# Patient Record
Sex: MALE | Race: WHITE
[De-identification: names, ages, dates, MRNs, and addresses within clinical notes are randomized per-mention and may not be internally consistent; named-entity substitution may affect disease eponyms.]

---

## 2017-09-22 ENCOUNTER — HOSPITAL ENCOUNTER (EMERGENCY)
Dept: HOSPITAL 80 - FED | Age: 37
Discharge: HOME | End: 2017-09-22
Payer: COMMERCIAL

## 2017-09-22 VITALS — RESPIRATION RATE: 18 BRPM | TEMPERATURE: 97.3 F

## 2017-09-22 VITALS — DIASTOLIC BLOOD PRESSURE: 79 MMHG | SYSTOLIC BLOOD PRESSURE: 134 MMHG | HEART RATE: 57 BPM | OXYGEN SATURATION: 97 %

## 2017-09-22 DIAGNOSIS — S82.832A: Primary | ICD-10-CM
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
## 2017-09-22 PROCEDURE — L4386 NON-PNEUM WALK BOOT PRE CST: HCPCS

## 2017-09-22 NOTE — EDPHY
H & P


Time Seen by Provider: 09/22/17 12:32


HPI/ROS: 





CHIEF COMPLAINT:  Left ankle injury





HISTORY OF PRESENT ILLNESS:  36-year-old male presents to the emergency 

department with injury to his left ankle.  [place] 

was [place] a corner and he is not exactly sure how he injured his left ankle 

but he does not remember putting his foot down.  He thinks that it might have 

gotten crushed when he fell.  He complains of isolated pain to the left ankle.  

Just happened prior to arrival.  He did not hit his head or lose consciousness.

  Denies neck or back pain.  Denies chest pain or difficulty breathing.  Denies 

abdominal pain.  He feels slightly nauseous but he feels that that is likely 

from the pain he is having of left ankle.  He did try to bear weight a however 

unable to.





REVIEW OF SYSTEMS:


Constitutional:  No fever, no chills.


Eyes:  No double or blurry vision.


ENT:  No sore throat.


Respiratory:  No cough, no shortness of breath.


Cardiac:  No chest pain.


Gastrointestinal:  No abdominal pain, vomiting or diarrhea.


Genitourinary:  No dysuria.


Musculoskeletal:  No neck or back pain.


Skin:  No rashes.


Neurological:  No headache.


Past Medical/Surgical History: 





Appendectomy


Social History: 





 and lives in [place]


Smoking Status: Never smoked


Physical Exam: 


General Appearance:  Alert, no distress.


Eyes:  Pupils equal and round.  Extraocular motions are all intact.


ENT:  Mouth:  Mucous membranes moist.


Respiratory:  No wheezing, rhonchi, or rales, lungs are clear to auscultation.


Cardiovascular:  Regular rate and rhythm.


Gastrointestinal:  Abdomen is soft and nontender, no masses, no rebound or 

guarding, bowel sounds normal.


Neurological:  Alert and oriented x 3, cranial nerves II through XII grossly 

intact


Skin:  Warm and dry, no rashes.


Musculoskeletal:  Nontender to palpate along the cervical, thoracic or lumbar 

spine.  Neck is supple.


Extremities:  Swelling noted to the left ankle.  Tender to palpate along the 

distal fibula.  Mildly tender to the medial aspect of the left ankle.  Limited 

dorsi and plantar flexion secondary to pain and swelling.  No abrasions.  

Normal sensation to light touch with normal 2 point discrimination.  Strong 

dorsalis pedis and posterior tibial pulse felt on the left foot and ankle.


Psychiatric:  Patient is oriented X 3, there is no agitation.


Constitutional: 


 Initial Vital Signs











Temperature (C)  36.3 C   09/22/17 12:27


 


Heart Rate  59 L  09/22/17 12:27


 


Respiratory Rate  18   09/22/17 12:27


 


Blood Pressure  135/93 H  09/22/17 12:27


 


O2 Sat (%)  98   09/22/17 12:27








 











O2 Delivery Mode               Room Air














Allergies/Adverse Reactions: 


 





No Known Allergies Allergy (Unverified 09/22/17 12:27)


 








Home Medications: 














 Medication  Instructions  Recorded


 


oxyCODONE/APAP 5/325 [Percocet 1 - 2 tab PO Q4-6PRN PRN #15 tab 09/22/17





5/325]  














Medical Decision Making





- Diagnostics


Imaging Results: 


 Imaging Impressions





Ankle X-Ray  09/22/17 12:37


Impression: Spiral distal fibular and posterior malleolar fractures.


 











Imaging: I viewed and interpreted images myself


Procedures: 





The patient was placed in Neely boot and given crutches.  This was examined 

post application in good placement with normal CNS.


ED Course/Re-evaluation: 





The patient was given strict instructions to ice and elevate his leg as much as 

possible to help reduce swelling.  He was encouraged to use anti-inflammatory 

such as ibuprofen.  He was also given a prescription for Percocet per his 

request.  He will follow up with orthopedic surgeon on Monday.  He understands 

that he should not bear any weight on this left ankle.


Differential Diagnosis: 





Including but not limited to fracture, dislocation, contusion, sprain





- Data Points


Medications Given: 


 








Discontinued Medications





Ibuprofen (Motrin)  600 mg PO EDNOW ONE


   Stop: 09/22/17 13:20


   Last Admin: 09/22/17 13:22 Dose:  600 mg








Departure





- Departure


Disposition: Home, Routine, Self-Care


Clinical Impression: 


Closed left ankle fracture


Qualifiers:


 Encounter type: initial encounter Qualified Code(s): S82.892A - Other fracture 

of left lower leg, initial encounter for closed fracture





Condition: Good


Instructions:  Ankle Fracture (ED)


Additional Instructions: 


Neely boot.  Nonweightbearing, use crutches.





Ibuprofen 600mg every 8 hours for pain as directed.





Percocet for severe pain as directed.








Referrals: 


Michael Cintron MD [Medical Doctor] - 2-3 days without fail


Prescriptions: 


oxyCODONE/APAP 5/325 [Percocet 5/325] 1 - 2 tab PO Q4-6PRN PRN #15 tab


 PRN Reason: For Moderate To Severe Pain

## 2018-09-25 ENCOUNTER — HOSPITAL ENCOUNTER (OUTPATIENT)
Dept: HOSPITAL 80 - FIMAGING | Age: 38
End: 2018-09-25
Attending: FAMILY MEDICINE
Payer: COMMERCIAL

## 2018-09-25 DIAGNOSIS — R05: Primary | ICD-10-CM
